# Patient Record
Sex: FEMALE | Race: WHITE | NOT HISPANIC OR LATINO | URBAN - METROPOLITAN AREA
[De-identification: names, ages, dates, MRNs, and addresses within clinical notes are randomized per-mention and may not be internally consistent; named-entity substitution may affect disease eponyms.]

---

## 2017-01-20 ENCOUNTER — OFFICE VISIT - HEALTHEAST (OUTPATIENT)
Dept: CARDIOLOGY | Facility: CLINIC | Age: 32
End: 2017-01-20

## 2017-01-20 DIAGNOSIS — R07.9 CHEST PAIN: ICD-10-CM

## 2017-01-20 DIAGNOSIS — R53.83 FATIGUE: ICD-10-CM

## 2017-01-20 ASSESSMENT — MIFFLIN-ST. JEOR: SCORE: 1421.17

## 2017-01-31 ENCOUNTER — HOSPITAL ENCOUNTER (OUTPATIENT)
Dept: CARDIOLOGY | Facility: CLINIC | Age: 32
Discharge: HOME | End: 2017-01-31
Attending: INTERNAL MEDICINE

## 2017-01-31 DIAGNOSIS — R07.9 CHEST PAIN: ICD-10-CM

## 2017-01-31 LAB
CV STRESS CURRENT BP HE: NORMAL
CV STRESS CURRENT HR HE: 108
CV STRESS CURRENT HR HE: 112
CV STRESS CURRENT HR HE: 113
CV STRESS CURRENT HR HE: 114
CV STRESS CURRENT HR HE: 116
CV STRESS CURRENT HR HE: 118
CV STRESS CURRENT HR HE: 122
CV STRESS CURRENT HR HE: 128
CV STRESS CURRENT HR HE: 135
CV STRESS CURRENT HR HE: 138
CV STRESS CURRENT HR HE: 143
CV STRESS CURRENT HR HE: 152
CV STRESS CURRENT HR HE: 162
CV STRESS CURRENT HR HE: 163
CV STRESS CURRENT HR HE: 166
CV STRESS CURRENT HR HE: 166
CV STRESS CURRENT HR HE: 170
CV STRESS CURRENT HR HE: 171
CV STRESS CURRENT HR HE: 78
CV STRESS CURRENT HR HE: 89
CV STRESS CURRENT HR HE: 90
CV STRESS CURRENT HR HE: 91
CV STRESS CURRENT HR HE: 93
CV STRESS CURRENT HR HE: 93
CV STRESS CURRENT HR HE: 94
CV STRESS CURRENT HR HE: 96
CV STRESS CURRENT HR HE: 97
CV STRESS CURRENT HR HE: 99
CV STRESS DEVIATION TIME HE: NORMAL
CV STRESS EXERCISE STAGE HE: NORMAL
CV STRESS EXERCISE STAGE REACHED HE: NORMAL
CV STRESS FINAL RESTING BP HE: NORMAL
CV STRESS FINAL RESTING HR HE: 91
CV STRESS MAX HR HE: 171
CV STRESS MAX TREADMILL GRADE HE: 16
CV STRESS MAX TREADMILL SPEED HE: 4.2
CV STRESS PEAK DIA BP HE: NORMAL
CV STRESS PEAK SYS BP HE: NORMAL
CV STRESS PHASE HE: NORMAL
CV STRESS PROTOCOL HE: NORMAL
CV STRESS REASON STOPPED HE: NORMAL
CV STRESS RESTING PT POSITION HE: NORMAL
CV STRESS RESTING PT POSITION HE: NORMAL
CV STRESS ST DEVIATION AMOUNT HE: NORMAL
CV STRESS ST DEVIATION ELEVATION HE: NORMAL
CV STRESS ST EVELATION AMOUNT HE: NORMAL
CV STRESS SYMPTOMS HE: NORMAL
CV STRESS TEST TYPE HE: NORMAL
CV STRESS TOTAL STAGE TIME MIN 1 HE: NORMAL
STRESS ECHO BASELINE BP: NORMAL
STRESS ECHO BASELINE HR: 79
STRESS ECHO CALCULATED PERCENT HR: NORMAL
STRESS ECHO LAST STRESS BP: NORMAL
STRESS ECHO LAST STRESS HR: 171
STRESS ECHO POST ESTIMATED WORKLOAD: 12.1
STRESS ECHO POST EXERCISE DUR MIN: 11
STRESS ECHO POST EXERCISE DUR SEC: 0
STRESS ECHO TARGET HR: 161

## 2017-03-31 ENCOUNTER — COMMUNICATION - HEALTHEAST (OUTPATIENT)
Dept: SCHEDULING | Facility: CLINIC | Age: 32
End: 2017-03-31

## 2017-09-19 ENCOUNTER — COMMUNICATION - HEALTHEAST (OUTPATIENT)
Dept: FAMILY MEDICINE | Facility: CLINIC | Age: 32
End: 2017-09-19

## 2021-05-30 VITALS — WEIGHT: 154 LBS | HEIGHT: 67 IN | BODY MASS INDEX: 24.17 KG/M2

## 2021-06-05 ENCOUNTER — RECORDS - HEALTHEAST (OUTPATIENT)
Dept: FAMILY MEDICINE | Facility: CLINIC | Age: 36
End: 2021-06-05

## 2021-06-05 DIAGNOSIS — O99.810 ABNORMAL GLUCOSE TOLERANCE OF MOTHER AFFECTING PREGNANCY, CHILDBIRTH, OR PUERPERIUM: ICD-10-CM

## 2021-06-08 NOTE — PROGRESS NOTES
[facility] Office Consult     Assessment:   1 Atypical chest discomfort.  Chest discomfort does not occur predictably with exertion.  Symptom occurs a few times per week and has been persistent.  She has a mild conduction delay/incomplete right bundle branch block.  No murmur or other findings to suggest congenital heart abnormality but would be prudent to do an echocardiogram to exclude any atrial septal defect.  I have recommended that we pursue an exercise stress echocardiogram given the complaints of fatigue and chest discomfort, as this is available for review.  I suggested that she consider cutting back her caffeine and if able.  Recent lipids from her primary care physician's office are reviewed for her total cholesterol was 188, HDL 71 and LDL 71.    2.  Fatigue.  I suspect that this is a manifestation of her busy household.  She does have a history of thyroid issues and her family.  I have advised a TSH which we will check today.  1. Chest pain  Echo Stress Exercise   2. Fatigue  Thyroid Stimulating Hormone (TSH)      Plan:   Stress echocardiogram  TSH        History of Present Illness:   Thank you for asking the [facility] team to see Massiel Gonzales a 31 y.o.  female  in consultation  to evaluate of atypical chest discomfort, fatigue and mild conduction delay noted on EKG.  She reports some intermittent chest discomfort a few times per week but typically occurs at the end of the day and is mild in its description.  She has not been overly active lately with respect to exercise but has a very active household has no specific complaint of chest discomfort with exertion.  She describes infrequent episodes of brief tachycardia palpitation.  She reports no orthopnea, or PND.  She reports fatigue and feeling tired during the day.  She does not believe that she snores.  She does not fall asleep at inappropriate times specifically driving the car.    Cardiac risk factors are negative for  tobacco, negative for significant alcohol, negative for hypertension, positive for gestational diabetes, negative for hyperlipidemia, father with a history of hypertension but no premature family history of heart disease or sudden death.  She tells me that her father was treated for thyroid cancer.  She indicates that in college she was able to participate in any physical exercise pursuits without difficulty.  She was concerned as her primary care provider indicated an abnormal EKG.  I reviewed the EKG with her today and indicated that it reveals a mild conduction delay.    Past Medical History:     Past Medical History   Diagnosis Date     Anemia      Diabetes mellitus      gestational/insulin     Lymphadenopathy        Past Surgical History:     Past Surgical History   Procedure Laterality Date     Cyst removal       removed from forehead       Family History:     Family History   Problem Relation Age of Onset     Hypertension Father      Diabetes Maternal Grandfather      No Medical Problems Mother      No Medical Problems Sister      No Medical Problems Brother        Social History:    reports that she has never smoked. She has never used smokeless tobacco. She reports that she does not drink alcohol or use illicit drugs.  The primary care physician is Nissa Ruiz MD  Meds:   Scheduled Meds:  Current Outpatient Prescriptions   Medication Sig Dispense Refill     docusate sodium (COLACE) 50 MG capsule Take by mouth 2 (two) times a day as needed for constipation.       LACTOBACILLUS ACIDOPHILUS (PROBIOTIC ORAL) Take 3 tablets by mouth 3 (three) times a day with meals.       omeprazole (PRILOSEC) 20 MG capsule Take 1 capsule (20 mg total) by mouth daily. 30 capsule 2     prenatal #115-iron-folic acid 29 mg iron- 1 mg Chew Chew daily.       No current facility-administered medications for this visit.        PRN Meds:.    Allergies:   Review of patient's allergies indicates no known  "allergies.          Objective:      Physical Exam  154 lb (69.9 kg)  5' 7\" (1.702 m)  Body mass index is 24.12 kg/(m^2).  Visit Vitals     /64 (Patient Site: Right Arm, Patient Position: Sitting, Cuff Size: Adult Regular)     Pulse 64     Resp 16     Ht 5' 7\" (1.702 m)     Wt 154 lb (69.9 kg)     BMI 24.12 kg/m2       General Appearance:   Alert, cooperative and in no acute distress.   HEENT:  No scleral icterus; the mucous membranes were pink and moist.   Neck: JVP within normal limits. No thyromegaly. No HJR   Chest: The spine was straight. The chest was symmetric.   Lungs:   Respirations unlabored; the lungs are clear to auscultation.   Cardiovascular:   S1 and S2 without murmur, clicks or rubs. Brachial, radial, carotid and posterior tibial pulses are intact and symetrical.  No carotid bruits noted   Abdomen:  No organomegaly, masses, bruits, or tenderness. Bowels sounds are present   Extremities: No cyanosis, clubbing, or edema.   Skin: No xanthelasma.   Neurologic: Mood and affect are appropriate.             Lab Reviewed Personally by myself    No results found for: NA, K, CL, CO2, BUN, CREATININE, GLUCOSE, CALCIUM  Lab Results   Component Value Date    CHOL 188 09/15/2016    TRIG 38 09/15/2016    HDL 71 09/15/2016     No results found for: BNP  No results found for: CREATININE  LDL CALCULATED (mg/dL)   Date Value   09/15/2016 109     Lab Results   Component Value Date    WBC 10.2 10/05/2015    HGB 14.2 09/15/2016    HCT 38.9 10/05/2015    MCV 89 10/05/2015     10/05/2015       Cardiac Testing:          ECG personally reviewed by myself shows December 20, 2016, sinus bradycardia, RSR prime or incomplete right bundle branch block         Review of Systems:       Review of Systems:   General: WNL  Eyes: WNL  Ears/Nose/Throat: WNL  Lungs: WNL  Heart: Chest Pain  Stomach: Heartburn  Bladder: WNL  Muscle/Joints: WNL  Skin: WNL  Nervous System: Dizziness  Mental Health: WNL     Blood: WNL      This " note has been dictated using voice recognition software. Any grammatical or context distortions are unintentional and inherent to the software.